# Patient Record
Sex: MALE | Race: WHITE | Employment: PART TIME | URBAN - METROPOLITAN AREA
[De-identification: names, ages, dates, MRNs, and addresses within clinical notes are randomized per-mention and may not be internally consistent; named-entity substitution may affect disease eponyms.]

---

## 2017-03-09 ENCOUNTER — HOSPITAL ENCOUNTER (EMERGENCY)
Facility: HOSPITAL | Age: 27
Discharge: HOME | End: 2017-03-09
Attending: EMERGENCY MEDICINE
Payer: MEDICAID

## 2017-03-09 VITALS
HEART RATE: 75 BPM | WEIGHT: 215 LBS | DIASTOLIC BLOOD PRESSURE: 72 MMHG | RESPIRATION RATE: 16 BRPM | TEMPERATURE: 98 F | SYSTOLIC BLOOD PRESSURE: 115 MMHG | OXYGEN SATURATION: 98 %

## 2017-03-09 DIAGNOSIS — Z76.0 ENCOUNTER FOR MEDICATION REFILL: Primary | ICD-10-CM

## 2017-03-09 PROCEDURE — 99283 EMERGENCY DEPT VISIT LOW MDM: CPT

## 2017-03-09 RX ORDER — LAMOTRIGINE 200 MG/1
400 TABLET ORAL DAILY
COMMUNITY

## 2017-03-09 RX ORDER — LAMOTRIGINE 200 MG/1
400 TABLET ORAL DAILY
Qty: 60 TABLET | Refills: 0 | Status: SHIPPED | OUTPATIENT
Start: 2017-03-09 | End: 2017-04-08

## 2017-03-09 NOTE — ED INITIAL ASSESSMENT (HPI)
Pt with epilepsy. Pt recent insurance changes, pt has a neurologist at [facility] but is not in network. Neurologist is Dr. Yanick Meehan. Pt ran out of epilepsy medicine.

## 2017-03-10 NOTE — ED PROVIDER NOTES
Patient Seen in: [facility] Emergency Department    History   Patient presents with:  Medication Administration    Stated Complaint: prescription refill    HPI    For his seizures for many years.   He states that his insurance has changed and he has murmur, rub, or gallop. S1 and S2 are normal.  ABDOMEN: Normoactive bowel sounds, no tenderness to palpation, no hepatosplenomegaly or masses. EXTREMITIES: Capillary refill time is normal without cyanosis, clubbing, or edema.   SKIN EXAM: There are no caesar

## 2018-04-09 ENCOUNTER — HOSPITAL ENCOUNTER (EMERGENCY)
Facility: HOSPITAL | Age: 28
Discharge: HOME | End: 2018-04-09
Attending: EMERGENCY MEDICINE
Payer: COMMERCIAL

## 2018-04-09 VITALS
DIASTOLIC BLOOD PRESSURE: 70 MMHG | SYSTOLIC BLOOD PRESSURE: 117 MMHG | OXYGEN SATURATION: 98 % | RESPIRATION RATE: 18 BRPM | HEART RATE: 67 BPM | WEIGHT: 210 LBS | HEIGHT: 77 IN | BODY MASS INDEX: 24.79 KG/M2 | TEMPERATURE: 98 F

## 2018-04-09 DIAGNOSIS — G40.909 NONINTRACTABLE EPILEPSY WITHOUT STATUS EPILEPTICUS, UNSPECIFIED EPILEPSY TYPE (HCC): Primary | ICD-10-CM

## 2018-04-09 PROCEDURE — 99283 EMERGENCY DEPT VISIT LOW MDM: CPT

## 2018-04-09 RX ORDER — LAMOTRIGINE 200 MG/1
400 TABLET ORAL DAILY
Qty: 60 TABLET | Refills: 0 | Status: SHIPPED | OUTPATIENT
Start: 2018-04-09 | End: 2018-05-09

## 2018-04-09 NOTE — ED INITIAL ASSESSMENT (HPI)
Pt to ED because he ran out of his perscription of lamotrigine. Pt has had trouble getting an appointment with a neurologist but does have an appointment Wednesday. Pt would like a refill to last him through to his appointment.

## 2018-05-09 ENCOUNTER — HOSPITAL ENCOUNTER (EMERGENCY)
Facility: HOSPITAL | Age: 28
Discharge: HOME | End: 2018-05-09
Attending: EMERGENCY MEDICINE
Payer: COMMERCIAL

## 2018-05-09 VITALS
BODY MASS INDEX: 24.79 KG/M2 | SYSTOLIC BLOOD PRESSURE: 125 MMHG | RESPIRATION RATE: 16 BRPM | WEIGHT: 210 LBS | HEIGHT: 77 IN | DIASTOLIC BLOOD PRESSURE: 76 MMHG | HEART RATE: 73 BPM | OXYGEN SATURATION: 100 % | TEMPERATURE: 98 F

## 2018-05-09 DIAGNOSIS — G40.909 NONINTRACTABLE EPILEPSY WITHOUT STATUS EPILEPTICUS, UNSPECIFIED EPILEPSY TYPE (HCC): Primary | ICD-10-CM

## 2018-05-09 PROCEDURE — 99283 EMERGENCY DEPT VISIT LOW MDM: CPT

## 2018-05-09 RX ORDER — LAMOTRIGINE 200 MG/1
400 TABLET ORAL DAILY
Qty: 60 TABLET | Refills: 0 | Status: SHIPPED | OUTPATIENT
Start: 2018-05-09 | End: 2018-06-08

## 2018-05-10 NOTE — ED INITIAL ASSESSMENT (HPI)
Pt out of meds for epilepsy. States he has appt on Monday, but is completely out of lamotrigine 200mg 2 tabs daily. Denies any problems.

## 2018-05-10 NOTE — ED PROVIDER NOTES
Patient Seen in: [facility] Emergency Department    History   Patient presents with:  Medication Administration    Stated Complaint: med refill lamotrigine     HPI    History of seizure disorder, on lamotrigine for some time, has not had a seizure i dry.    ED Course   Labs Reviewed - No data to display    ED Course as of May 09 2316  ------------------------------------------------------------      MDM     Lamotrigine refilled. No medical complaints, appears medically well. He will be discharged.

## 2018-06-26 NOTE — ED PROVIDER NOTES
Patient Seen in: [facility] Emergency Department    History   Patient presents with:   Anxiety/Panic attack (neurologic)    Stated Complaint: anxiety    HPI    Patient is a 51-year-old male with a history of anxiety and seizure disorder who presents rate, regular rhythm and normal heart sounds. Pulmonary/Chest: Effort normal and breath sounds normal.   Abdominal: Soft. Bowel sounds are normal.   Musculoskeletal: Normal range of motion.    Neurological: He is alert and oriented to person, place, and

## 2018-06-26 NOTE — ED INITIAL ASSESSMENT (HPI)
Patient dx with anxiety and depression, he states last night he had 3 panic attacks and had some auditory hallucinations and thought there was a bee in the kitchen when there wasn't.  He states he was very impulsive and unable to handle situations he [place]

## (undated) NOTE — ED AVS SNAPSHOT
Gladys Mckenzie   MRN: [id number]    Department:  [facility] Emergency Department   Date of Visit:  4/9/2018           Disclosure     Insurance plans vary and the physician(s) referred by the ER may not be covered by your plan.  Please contact your tell this physician (or your personal doctor if your instructions are to return to your personal doctor) about any new or lasting problems. The primary care or specialist physician will see patients referred from the [facility] Emergency Department.  Eddi Florentino

## (undated) NOTE — ED AVS SNAPSHOT
Suman Madison   MRN: [id number]    Department:  [facility] Emergency Department   Date of Visit:  5/9/2018           Disclosure     Insurance plans vary and the physician(s) referred by the ER may not be covered by your plan.  Please contact your tell this physician (or your personal doctor if your instructions are to return to your personal doctor) about any new or lasting problems. The primary care or specialist physician will see patients referred from the [facility] Emergency Department.  Naomi Guzman

## (undated) NOTE — LETTER
Date & Time: 6/26/2018, 3:32 PM  Patient: Mayela Dennis  Encounter Provider(s):    Jacinta Paris MD       To Whom It May Concern:    Phillip White was seen and treated in our department on 6/26/2018. He can return to work on 6/27/2018.   If you have an

## (undated) NOTE — ED AVS SNAPSHOT
[facility] Emergency Department    [place]  [address] [address]    Phone:  [phone]    Fax:  [phone]           Adriano Hinojosa   MRN: [id number]    Department:  [facility] Emergency Department   Date of Visit:  3/9/2 IF THERE IS ANY CHANGE OR WORSENING OF YOUR CONDITION, CALL YOUR PRIMARY CARE PHYSICIAN AT ONCE OR RETURN IMMEDIATELY TO THE EMERGENCY DEPARTMENT.     If you have been prescribed any medication(s), please fill your prescription right away and begin taking t

## (undated) NOTE — ED AVS SNAPSHOT
[facility] Emergency Department    [place]  [address] [address]    Phone:  [phone]    Fax:  [phone]           Ricco Kussmaul   MRN: [id number]    Department:  [facility] Emergency Department   Date of Visit:  3/9/2 To Check ER Wait Times:  TEXT 'ERwait' to 30613      Click [email or web address]      Or call [phone]    If you have any problems with your follow-up, please call our  at [phone]    Si usted tiene algun problema con duong sequimiento, por f I have read and understand the instructions given to me by my caregivers. [facility]        Pharmacy Address Phone Number   [address] ([address]) Patrice Conroy visit,  view other health information, and more. To sign up or find more information, go to [email or web address] and click on the Sign Up Now link in the [employer] box.      Enter your [email or web address] Activation Code exactly as it appears below along with yo